# Patient Record
Sex: FEMALE | Race: WHITE | NOT HISPANIC OR LATINO | Employment: OTHER | URBAN - METROPOLITAN AREA
[De-identification: names, ages, dates, MRNs, and addresses within clinical notes are randomized per-mention and may not be internally consistent; named-entity substitution may affect disease eponyms.]

---

## 2017-10-17 ENCOUNTER — OFFICE VISIT (OUTPATIENT)
Dept: ORTHOPEDIC SURGERY | Facility: CLINIC | Age: 67
End: 2017-10-17

## 2017-10-17 VITALS — BODY MASS INDEX: 26.29 KG/M2 | TEMPERATURE: 97.5 F | HEIGHT: 63 IN | WEIGHT: 148.4 LBS

## 2017-10-17 DIAGNOSIS — M20.12 HALLUX VALGUS, LEFT: ICD-10-CM

## 2017-10-17 DIAGNOSIS — M79.671 RIGHT FOOT PAIN: Primary | ICD-10-CM

## 2017-10-17 DIAGNOSIS — M20.11 HALLUX VALGUS, RIGHT: ICD-10-CM

## 2017-10-17 PROCEDURE — 99203 OFFICE O/P NEW LOW 30 MIN: CPT | Performed by: ORTHOPAEDIC SURGERY

## 2017-10-17 PROCEDURE — 73630 X-RAY EXAM OF FOOT: CPT | Performed by: ORTHOPAEDIC SURGERY

## 2017-10-17 RX ORDER — HYDROCHLOROTHIAZIDE 12.5 MG/1
12.5 CAPSULE, GELATIN COATED ORAL EVERY MORNING
COMMUNITY
Start: 2017-09-12

## 2017-10-17 RX ORDER — TIMOLOL MALEATE 5 MG/ML
1 SOLUTION/ DROPS OPHTHALMIC DAILY
COMMUNITY
Start: 2017-09-14

## 2017-10-17 RX ORDER — CEFUROXIME AXETIL 250 MG/1
250 TABLET ORAL DAILY
COMMUNITY
Start: 2017-07-21

## 2017-10-17 RX ORDER — OMEPRAZOLE 20 MG/1
20 CAPSULE, DELAYED RELEASE ORAL DAILY
COMMUNITY

## 2017-10-17 RX ORDER — IBUPROFEN 600 MG/1
600 TABLET ORAL EVERY 6 HOURS PRN
COMMUNITY
Start: 2017-09-12

## 2017-10-17 RX ORDER — CYCLOBENZAPRINE HCL 10 MG
10 TABLET ORAL 3 TIMES DAILY PRN
COMMUNITY

## 2017-10-17 RX ORDER — LATANOPROST 50 UG/ML
1 SOLUTION/ DROPS OPHTHALMIC DAILY
COMMUNITY
Start: 2017-09-14

## 2017-10-17 NOTE — PROGRESS NOTES
Patient:  Diana Olivas is a 67 y.o. female    Chief Complaint/ Reason for Visit:    Chief Complaint   Patient presents with   • Right Foot - Establish Care       HPI:  The patient presents today complaining of mild to moderate intermittent aching pain associated with a bunion deformity of her right foot.  She has a bunion on her left foot but says it hasn't really ever bothered her.  She says that the right bunion has actually not bothered her in about a week.  She says that there are some shoes she wears that aggravate it and there are shoes that she wears a don't bother at all.  She says she's had the bunions for years but the right one is already starting to bother her over the past few months.  She said she has a friend is had bunion surgery on both feet and told her that she needed to have her bunions repaired before they got worse.      PMH:    Past Medical History:   Diagnosis Date   • GERD (gastroesophageal reflux disease)    • Hypertension        PSH:  History reviewed. No pertinent surgical history.    Social Hx:    Social History     Social History   • Marital status:      Spouse name: N/A   • Number of children: N/A   • Years of education: N/A     Occupational History   • Not on file.     Social History Main Topics   • Smoking status: Never Smoker   • Smokeless tobacco: Never Used   • Alcohol use Yes      Comment: social   • Drug use: No   • Sexual activity: Not on file     Other Topics Concern   • Not on file     Social History Narrative   • No narrative on file       Family Hx:    Family History   Problem Relation Age of Onset   • Cancer Mother    • Cancer Father    • Diabetes Other    • Lung disease Other    • Hypertension Other        Meds:    Current Outpatient Prescriptions:   •  cefuroxime (CEFTIN) 250 MG tablet, Take 250 mg by mouth Daily., Disp: , Rfl:   •  cyclobenzaprine (FLEXERIL) 10 MG tablet, Take 10 mg by mouth 3 (Three) Times a Day As Needed for Muscle Spasms., Disp: , Rfl:  "  •  hydrochlorothiazide (MICROZIDE) 12.5 MG capsule, Take 12.5 mg by mouth Every Morning., Disp: , Rfl:   •  ibuprofen (ADVIL,MOTRIN) 600 MG tablet, Take 600 mg by mouth Every 6 (Six) Hours As Needed., Disp: , Rfl:   •  latanoprost (XALATAN) 0.005 % ophthalmic solution, Administer 1 drop to both eyes Daily., Disp: , Rfl:   •  omeprazole (priLOSEC) 20 MG capsule, Take 20 mg by mouth Daily., Disp: , Rfl:   •  timolol (TIMOPTIC) 0.5 % ophthalmic solution, Administer 1 drop to both eyes Daily., Disp: , Rfl:     Allergies:  No Known Allergies    ROS:  Review of Systems    Vitals:    10/17/17 1203   Temp: 97.5 °F (36.4 °C)   TempSrc: Temporal Artery    Weight: 148 lb 6.4 oz (67.3 kg)   Height: 63\" (160 cm)       Physical Exam    The patient is awake, alert, and oriented ×3.  The patient is in no acute distress.  Breathing is regular and unlabored with a respiratory rate of 12/m.  Extraocular movements and pupillary responses are symmetrically intact. Sclerae are anicteric.   Hearing is within normal limits.  Speech is within normal limits.  There is no jugular venous distention.    On standing exam, the patient does have mild to moderate hallux valgus deformity bilaterally.  I do not see any hammertoe deformities.  Her feet appear warm and well perfused.  Her hindfoot axis appears neutral to slight valgus symmetrically.  She does not have any pronounced overpronation.  She has no atrophy or asymmetry of the musculature lower extremity.  Her calves are soft and nontender with no venous cord.  Her Achilles tendons are little bit tight but she has no spasticity, cogwheeling, or clonus.  She has no significant tenderness over her bunion prominences on either foot.  She does have mild decrease in sensation to light touch over the medial aspect of the great toes bilaterally.  She has palpable regular dorsalis pedis and posterior tibial pulses present symmetrically in both feet with a current heart rate of about 66 bpm.  She " does not have hypermobility of the first metatarsocuneiform joint in either foot.  Her skin and nails are unremarkable.  She has no cellulitis, no lymphangitis, and no popliteal lymphadenopathy in either lower extremity.      Radiology: X-rays: 3 views the patient's right foot were ordered and reviewed today to assess her concerns about her right foot pain.  Comparison images were not available.  She has a mild hallux valgus.  I don't see any evidence of stress fracture.          Assessment:  1. Right foot pain    - XR Foot 3+ View Right    2. Hallux valgus, right      3. Hallux valgus, left            Plan:  I discussed everything with the patient at length.  I reviewed her films and findings with her.  I recommended strongly that she exhaust nonsurgical management.  I do not think that she has a surgical problem at this time, and, in fact, if she chooses her foot wear and activities wisely, she may never need bunion surgery.  I told her this and she was placed.  She was fitted with and issued various toe separators to try, and we discussed the importance of appropriately fitted and sized footwear with particular attention to the width of the toe box.  We also discussed footwear to avoid.  I discussed the importance of Achilles tendon stretching.  She voiced understanding.  She was pleased to avoid surgery.  I think her chances of avoiding surgery are excellent.  She may follow-up as needed.      Orders Placed This Encounter   Procedures   • XR Foot 3+ View Right     Order Specific Question:   Reason for Exam:     Answer:   right foot pain

## 2017-10-20 PROBLEM — M20.12 HALLUX VALGUS, LEFT: Status: ACTIVE | Noted: 2017-10-20

## 2017-10-20 PROBLEM — M20.11 HALLUX VALGUS, RIGHT: Status: ACTIVE | Noted: 2017-10-20

## 2017-10-20 PROBLEM — M79.671 RIGHT FOOT PAIN: Status: ACTIVE | Noted: 2017-10-20
